# Patient Record
Sex: FEMALE | URBAN - METROPOLITAN AREA
[De-identification: names, ages, dates, MRNs, and addresses within clinical notes are randomized per-mention and may not be internally consistent; named-entity substitution may affect disease eponyms.]

---

## 2020-02-03 ENCOUNTER — TELEPHONE (OUTPATIENT)
Dept: OBGYN CLINIC | Age: 40
End: 2020-02-03

## 2020-02-03 NOTE — TELEPHONE ENCOUNTER
Received phone call from Laya Gutierrez at [address] [place] [address] office reporting that this patient is early pregnant and experienced bleeding over the weekend concerning for miscarriage. While on the phone, appointment was scheduled for patient at 2:00PM this afternoon with ultrasound and followed by visit with Dr. Matrin Tomlinson. Appointment information was provided to Ms. Heri Diop to give to patient. I subsequently noticed that the appointment had been cancelled by patient.